# Patient Record
Sex: FEMALE | Race: OTHER | NOT HISPANIC OR LATINO
[De-identification: names, ages, dates, MRNs, and addresses within clinical notes are randomized per-mention and may not be internally consistent; named-entity substitution may affect disease eponyms.]

---

## 2017-09-13 ENCOUNTER — RESULT REVIEW (OUTPATIENT)
Age: 27
End: 2017-09-13

## 2017-09-17 ENCOUNTER — EMERGENCY (EMERGENCY)
Facility: HOSPITAL | Age: 27
LOS: 1 days | Discharge: HOME | End: 2017-09-17
Attending: EMERGENCY MEDICINE | Admitting: EMERGENCY MEDICINE
Payer: COMMERCIAL

## 2017-09-17 VITALS
TEMPERATURE: 98 F | SYSTOLIC BLOOD PRESSURE: 113 MMHG | DIASTOLIC BLOOD PRESSURE: 78 MMHG | OXYGEN SATURATION: 98 % | HEIGHT: 66 IN | RESPIRATION RATE: 17 BRPM | HEART RATE: 97 BPM | WEIGHT: 195.11 LBS

## 2017-09-17 VITALS
SYSTOLIC BLOOD PRESSURE: 99 MMHG | HEART RATE: 71 BPM | TEMPERATURE: 98 F | DIASTOLIC BLOOD PRESSURE: 71 MMHG | RESPIRATION RATE: 15 BRPM | OXYGEN SATURATION: 98 %

## 2017-09-17 DIAGNOSIS — Z98.890 OTHER SPECIFIED POSTPROCEDURAL STATES: Chronic | ICD-10-CM

## 2017-09-17 DIAGNOSIS — Z90.5 ACQUIRED ABSENCE OF KIDNEY: Chronic | ICD-10-CM

## 2017-09-17 DIAGNOSIS — H53.9 UNSPECIFIED VISUAL DISTURBANCE: ICD-10-CM

## 2017-09-17 DIAGNOSIS — R51 HEADACHE: ICD-10-CM

## 2017-09-17 PROCEDURE — 70450 CT HEAD/BRAIN W/O DYE: CPT | Mod: 26

## 2017-09-17 PROCEDURE — 99284 EMERGENCY DEPT VISIT MOD MDM: CPT

## 2017-09-17 PROCEDURE — 99284 EMERGENCY DEPT VISIT MOD MDM: CPT | Mod: 25

## 2017-09-17 PROCEDURE — 70450 CT HEAD/BRAIN W/O DYE: CPT

## 2017-09-17 RX ORDER — ACETAMINOPHEN 500 MG
650 TABLET ORAL ONCE
Qty: 0 | Refills: 0 | Status: COMPLETED | OUTPATIENT
Start: 2017-09-17 | End: 2017-09-17

## 2017-09-17 RX ORDER — IBUPROFEN 200 MG
600 TABLET ORAL ONCE
Qty: 0 | Refills: 0 | Status: COMPLETED | OUTPATIENT
Start: 2017-09-17 | End: 2017-09-17

## 2017-09-17 RX ADMIN — Medication 600 MILLIGRAM(S): at 11:35

## 2017-09-17 RX ADMIN — Medication 650 MILLIGRAM(S): at 11:35

## 2017-09-17 NOTE — ED ADULT NURSE NOTE - OBJECTIVE STATEMENT
pt aox4," blurred vision x 1 month, double vision since Yesterday" no n/v, no sob, on Birth C. p, no weakness

## 2017-09-17 NOTE — ED ADULT NURSE NOTE - CAS DISCH CONDITION
Anesthesia Evaluation        Airway   Mallampati: I  TM distance: >3 FB  Neck ROM: full  no difficulty expected  Dental      Pulmonary    (+) COPD, sleep apnea,   Cardiovascular     (+) hypertension, past MI , CAD, CABG, CHF, PVD,       Neuro/Psych  (+) psychiatric history,    GI/Hepatic/Renal/Endo    (+)  GERD, PUD, diabetes mellitus,     Musculoskeletal     Abdominal    Substance History      OB/GYN          Other                                Anesthesia Plan    ASA 4     general   (Ryan)  intravenous induction   Anesthetic plan and risks discussed with patient.    Plan discussed with CRNA.      
Improved

## 2017-09-17 NOTE — ED ADULT NURSE NOTE - CHIEF COMPLAINT QUOTE
"I had double vision yesterday and 2 of my toes are tingling. I was referred to a neurologist. My doctor wants me to get a cat scan to R/O pseudo tumor"

## 2017-09-17 NOTE — ED PROVIDER NOTE - OBJECTIVE STATEMENT
double vision yesterday, lasted 30 seconds, pain back of eyes x 2 months.  pt saw Dr. Colon (opthal) this past Friday, referred to neurologist ot r/o pseudotumor and unable to get an appt until October 11. 2017.  pt has go on the plane this Friday and didn't want a tumor in her brain.

## 2017-09-17 NOTE — ED ADULT NURSE REASSESSMENT NOTE - COMFORT CARE
wait time explained/side rails up/plan of care explained/assisted to bathroom/darkened lights/repositioned

## 2017-09-17 NOTE — ED ADULT NURSE REASSESSMENT NOTE - NS ED NURSE REASSESS COMMENT FT1
received pt on #6. came in ED with c/o headache 6/10 at this time, due medication given as ordered. patient stated she went to see her Gyne MD and a pregnancy test done - negative. patient was s/e by Dinh KAUR and was referred to Neuro.

## 2017-10-11 ENCOUNTER — APPOINTMENT (OUTPATIENT)
Dept: OPHTHALMOLOGY | Facility: CLINIC | Age: 27
End: 2017-10-11
Payer: COMMERCIAL

## 2017-10-11 DIAGNOSIS — H53.10 UNSPECIFIED SUBJECTIVE VISUAL DISTURBANCES: ICD-10-CM

## 2017-10-11 DIAGNOSIS — Z83.518 FAMILY HISTORY OF OTHER SPECIFIED EYE DISORDER: ICD-10-CM

## 2017-10-11 PROCEDURE — 92083 EXTENDED VISUAL FIELD XM: CPT

## 2017-10-11 PROCEDURE — 99204 OFFICE O/P NEW MOD 45 MIN: CPT

## 2017-10-12 PROBLEM — Z83.518 FAMILY HISTORY OF CATARACTS: Status: ACTIVE | Noted: 2017-10-11

## 2017-10-12 RX ORDER — LOSARTAN POTASSIUM 25 MG/1
25 TABLET, FILM COATED ORAL
Refills: 0 | Status: ACTIVE | COMMUNITY

## 2017-11-07 ENCOUNTER — APPOINTMENT (OUTPATIENT)
Dept: NEUROLOGY | Facility: CLINIC | Age: 27
End: 2017-11-07
Payer: COMMERCIAL

## 2017-11-07 VITALS
SYSTOLIC BLOOD PRESSURE: 110 MMHG | RESPIRATION RATE: 16 BRPM | DIASTOLIC BLOOD PRESSURE: 64 MMHG | HEART RATE: 88 BPM | OXYGEN SATURATION: 98 % | WEIGHT: 218 LBS | HEIGHT: 66 IN | BODY MASS INDEX: 35.03 KG/M2

## 2017-11-07 DIAGNOSIS — G43.709 CHRONIC MIGRAINE W/OUT AURA, NOT INTRACTABLE, W/OUT STATUS MIGRAINOSUS: ICD-10-CM

## 2017-11-07 PROCEDURE — 99244 OFF/OP CNSLTJ NEW/EST MOD 40: CPT

## 2017-11-07 RX ORDER — PROPRANOLOL HYDROCHLORIDE 80 MG/1
80 CAPSULE, EXTENDED RELEASE ORAL
Qty: 30 | Refills: 3 | Status: ACTIVE | COMMUNITY
Start: 2017-11-07 | End: 1900-01-01

## 2017-11-26 ENCOUNTER — FORM ENCOUNTER (OUTPATIENT)
Age: 27
End: 2017-11-26

## 2017-11-27 ENCOUNTER — APPOINTMENT (OUTPATIENT)
Dept: MRI IMAGING | Facility: CLINIC | Age: 27
End: 2017-11-27
Payer: COMMERCIAL

## 2017-11-27 ENCOUNTER — OUTPATIENT (OUTPATIENT)
Dept: OUTPATIENT SERVICES | Facility: HOSPITAL | Age: 27
LOS: 1 days | End: 2017-11-27
Payer: COMMERCIAL

## 2017-11-27 DIAGNOSIS — Z98.890 OTHER SPECIFIED POSTPROCEDURAL STATES: Chronic | ICD-10-CM

## 2017-11-27 DIAGNOSIS — Z00.8 ENCOUNTER FOR OTHER GENERAL EXAMINATION: ICD-10-CM

## 2017-11-27 DIAGNOSIS — Z90.5 ACQUIRED ABSENCE OF KIDNEY: Chronic | ICD-10-CM

## 2017-11-27 PROCEDURE — 70551 MRI BRAIN STEM W/O DYE: CPT

## 2017-11-27 PROCEDURE — 70551 MRI BRAIN STEM W/O DYE: CPT | Mod: 26

## 2019-02-28 ENCOUNTER — OUTPATIENT (OUTPATIENT)
Dept: OUTPATIENT SERVICES | Facility: HOSPITAL | Age: 29
LOS: 1 days | End: 2019-02-28
Payer: COMMERCIAL

## 2019-02-28 ENCOUNTER — APPOINTMENT (OUTPATIENT)
Dept: NUCLEAR MEDICINE | Facility: HOSPITAL | Age: 29
End: 2019-02-28

## 2019-02-28 DIAGNOSIS — Z90.5 ACQUIRED ABSENCE OF KIDNEY: Chronic | ICD-10-CM

## 2019-02-28 DIAGNOSIS — I89.0 LYMPHEDEMA, NOT ELSEWHERE CLASSIFIED: ICD-10-CM

## 2019-02-28 DIAGNOSIS — Z98.890 OTHER SPECIFIED POSTPROCEDURAL STATES: Chronic | ICD-10-CM

## 2019-02-28 PROCEDURE — A9541: CPT

## 2019-02-28 PROCEDURE — 78195 LYMPH SYSTEM IMAGING: CPT

## 2019-02-28 PROCEDURE — 78195 LYMPH SYSTEM IMAGING: CPT | Mod: 26

## 2021-09-05 ENCOUNTER — OUTPATIENT (OUTPATIENT)
Dept: INPATIENT UNIT | Facility: HOSPITAL | Age: 31
LOS: 1 days | Discharge: HOME | End: 2021-09-05
Payer: COMMERCIAL

## 2021-09-05 VITALS — OXYGEN SATURATION: 99 %

## 2021-09-05 VITALS — HEART RATE: 112 BPM | OXYGEN SATURATION: 99 %

## 2021-09-05 DIAGNOSIS — Z98.82 BREAST IMPLANT STATUS: Chronic | ICD-10-CM

## 2021-09-05 DIAGNOSIS — Z90.5 ACQUIRED ABSENCE OF KIDNEY: Chronic | ICD-10-CM

## 2021-09-05 DIAGNOSIS — O26.899 OTHER SPECIFIED PREGNANCY RELATED CONDITIONS, UNSPECIFIED TRIMESTER: ICD-10-CM

## 2021-09-05 DIAGNOSIS — Z90.79 ACQUIRED ABSENCE OF OTHER GENITAL ORGAN(S): Chronic | ICD-10-CM

## 2021-09-05 DIAGNOSIS — Z3A.00 WEEKS OF GESTATION OF PREGNANCY NOT SPECIFIED: ICD-10-CM

## 2021-09-05 DIAGNOSIS — Z98.890 OTHER SPECIFIED POSTPROCEDURAL STATES: Chronic | ICD-10-CM

## 2021-09-05 LAB
ALBUMIN SERPL ELPH-MCNC: 3.6 G/DL — SIGNIFICANT CHANGE UP (ref 3.3–5)
ALP SERPL-CCNC: 139 U/L — HIGH (ref 40–120)
ALT FLD-CCNC: 20 U/L — SIGNIFICANT CHANGE UP (ref 4–33)
ANION GAP SERPL CALC-SCNC: 16 MMOL/L — HIGH (ref 7–14)
APPEARANCE UR: CLEAR — SIGNIFICANT CHANGE UP
AST SERPL-CCNC: 20 U/L — SIGNIFICANT CHANGE UP (ref 4–32)
B PERT DNA SPEC QL NAA+PROBE: SIGNIFICANT CHANGE UP
B PERT+PARAPERT DNA PNL SPEC NAA+PROBE: SIGNIFICANT CHANGE UP
BACTERIA # UR AUTO: ABNORMAL
BILIRUB SERPL-MCNC: 0.4 MG/DL — SIGNIFICANT CHANGE UP (ref 0.2–1.2)
BILIRUB UR-MCNC: NEGATIVE — SIGNIFICANT CHANGE UP
BORDETELLA PARAPERTUSSIS (RAPRVP): SIGNIFICANT CHANGE UP
BUN SERPL-MCNC: 13 MG/DL — SIGNIFICANT CHANGE UP (ref 7–23)
C PNEUM DNA SPEC QL NAA+PROBE: SIGNIFICANT CHANGE UP
CALCIUM SERPL-MCNC: 8.9 MG/DL — SIGNIFICANT CHANGE UP (ref 8.4–10.5)
CHLORIDE SERPL-SCNC: 99 MMOL/L — SIGNIFICANT CHANGE UP (ref 98–107)
CO2 SERPL-SCNC: 18 MMOL/L — LOW (ref 22–31)
COLOR SPEC: YELLOW — SIGNIFICANT CHANGE UP
CREAT SERPL-MCNC: 0.71 MG/DL — SIGNIFICANT CHANGE UP (ref 0.5–1.3)
DIFF PNL FLD: ABNORMAL
EPI CELLS # UR: SIGNIFICANT CHANGE UP /HPF (ref 0–5)
FLUAV SUBTYP SPEC NAA+PROBE: SIGNIFICANT CHANGE UP
FLUBV RNA SPEC QL NAA+PROBE: SIGNIFICANT CHANGE UP
GLUCOSE SERPL-MCNC: 112 MG/DL — HIGH (ref 70–99)
GLUCOSE UR QL: NEGATIVE — SIGNIFICANT CHANGE UP
HADV DNA SPEC QL NAA+PROBE: SIGNIFICANT CHANGE UP
HCOV 229E RNA SPEC QL NAA+PROBE: SIGNIFICANT CHANGE UP
HCOV HKU1 RNA SPEC QL NAA+PROBE: SIGNIFICANT CHANGE UP
HCOV NL63 RNA SPEC QL NAA+PROBE: SIGNIFICANT CHANGE UP
HCOV OC43 RNA SPEC QL NAA+PROBE: SIGNIFICANT CHANGE UP
HCT VFR BLD CALC: 35.8 % — SIGNIFICANT CHANGE UP (ref 34.5–45)
HGB BLD-MCNC: 12.1 G/DL — SIGNIFICANT CHANGE UP (ref 11.5–15.5)
HMPV RNA SPEC QL NAA+PROBE: SIGNIFICANT CHANGE UP
HPIV1 RNA SPEC QL NAA+PROBE: SIGNIFICANT CHANGE UP
HPIV2 RNA SPEC QL NAA+PROBE: SIGNIFICANT CHANGE UP
HPIV3 RNA SPEC QL NAA+PROBE: SIGNIFICANT CHANGE UP
HPIV4 RNA SPEC QL NAA+PROBE: SIGNIFICANT CHANGE UP
HYALINE CASTS # UR AUTO: SIGNIFICANT CHANGE UP /LPF (ref 0–7)
KETONES UR-MCNC: ABNORMAL
LEUKOCYTE ESTERASE UR-ACNC: NEGATIVE — SIGNIFICANT CHANGE UP
M PNEUMO DNA SPEC QL NAA+PROBE: SIGNIFICANT CHANGE UP
MCHC RBC-ENTMCNC: 28.5 PG — SIGNIFICANT CHANGE UP (ref 27–34)
MCHC RBC-ENTMCNC: 33.8 GM/DL — SIGNIFICANT CHANGE UP (ref 32–36)
MCV RBC AUTO: 84.4 FL — SIGNIFICANT CHANGE UP (ref 80–100)
NITRITE UR-MCNC: NEGATIVE — SIGNIFICANT CHANGE UP
NRBC # BLD: 0 /100 WBCS — SIGNIFICANT CHANGE UP
NRBC # FLD: 0 K/UL — SIGNIFICANT CHANGE UP
PH UR: 6 — SIGNIFICANT CHANGE UP (ref 5–8)
PLATELET # BLD AUTO: 467 K/UL — HIGH (ref 150–400)
POTASSIUM SERPL-MCNC: 3.9 MMOL/L — SIGNIFICANT CHANGE UP (ref 3.5–5.3)
POTASSIUM SERPL-SCNC: 3.9 MMOL/L — SIGNIFICANT CHANGE UP (ref 3.5–5.3)
PROT SERPL-MCNC: 7.1 G/DL — SIGNIFICANT CHANGE UP (ref 6–8.3)
PROT UR-MCNC: ABNORMAL
RAPID RVP RESULT: SIGNIFICANT CHANGE UP
RBC # BLD: 4.24 M/UL — SIGNIFICANT CHANGE UP (ref 3.8–5.2)
RBC # FLD: 13.2 % — SIGNIFICANT CHANGE UP (ref 10.3–14.5)
RBC CASTS # UR COMP ASSIST: SIGNIFICANT CHANGE UP /HPF (ref 0–4)
RSV RNA SPEC QL NAA+PROBE: SIGNIFICANT CHANGE UP
RV+EV RNA SPEC QL NAA+PROBE: SIGNIFICANT CHANGE UP
SARS-COV-2 RNA SPEC QL NAA+PROBE: SIGNIFICANT CHANGE UP
SODIUM SERPL-SCNC: 133 MMOL/L — LOW (ref 135–145)
SP GR SPEC: 1.03 — SIGNIFICANT CHANGE UP (ref 1–1.05)
UROBILINOGEN FLD QL: SIGNIFICANT CHANGE UP
WBC # BLD: 13.33 K/UL — HIGH (ref 3.8–10.5)
WBC # FLD AUTO: 13.33 K/UL — HIGH (ref 3.8–10.5)
WBC UR QL: SIGNIFICANT CHANGE UP /HPF (ref 0–5)

## 2021-09-05 PROCEDURE — 76815 OB US LIMITED FETUS(S): CPT | Mod: 26

## 2021-09-05 PROCEDURE — 99214 OFFICE O/P EST MOD 30 MIN: CPT | Mod: 25

## 2021-09-05 PROCEDURE — 59025 FETAL NON-STRESS TEST: CPT | Mod: 26

## 2021-09-05 RX ORDER — ACETAMINOPHEN 500 MG
975 TABLET ORAL ONCE
Refills: 0 | Status: COMPLETED | OUTPATIENT
Start: 2021-09-05 | End: 2021-09-05

## 2021-09-05 RX ORDER — SODIUM CHLORIDE 9 MG/ML
500 INJECTION, SOLUTION INTRAVENOUS ONCE
Refills: 0 | Status: COMPLETED | OUTPATIENT
Start: 2021-09-05 | End: 2021-09-05

## 2021-09-05 RX ORDER — ONDANSETRON 8 MG/1
8 TABLET, FILM COATED ORAL ONCE
Refills: 0 | Status: DISCONTINUED | OUTPATIENT
Start: 2021-09-05 | End: 2021-09-05

## 2021-09-05 RX ORDER — ONDANSETRON 8 MG/1
8 TABLET, FILM COATED ORAL ONCE
Refills: 0 | Status: COMPLETED | OUTPATIENT
Start: 2021-09-05 | End: 2021-09-05

## 2021-09-05 RX ORDER — SODIUM CHLORIDE 9 MG/ML
1000 INJECTION, SOLUTION INTRAVENOUS
Refills: 0 | Status: DISCONTINUED | OUTPATIENT
Start: 2021-09-05 | End: 2021-09-19

## 2021-09-05 RX ORDER — SODIUM CHLORIDE 9 MG/ML
1000 INJECTION, SOLUTION INTRAVENOUS ONCE
Refills: 0 | Status: COMPLETED | OUTPATIENT
Start: 2021-09-05 | End: 2021-09-05

## 2021-09-05 RX ADMIN — Medication 975 MILLIGRAM(S): at 09:53

## 2021-09-05 RX ADMIN — SODIUM CHLORIDE 250 MILLILITER(S): 9 INJECTION, SOLUTION INTRAVENOUS at 11:35

## 2021-09-05 RX ADMIN — SODIUM CHLORIDE 1000 MILLILITER(S): 9 INJECTION, SOLUTION INTRAVENOUS at 09:54

## 2021-09-05 RX ADMIN — ONDANSETRON 8 MILLIGRAM(S): 8 TABLET, FILM COATED ORAL at 10:16

## 2021-09-05 RX ADMIN — SODIUM CHLORIDE 1000 MILLILITER(S): 9 INJECTION, SOLUTION INTRAVENOUS at 11:03

## 2021-09-05 NOTE — OB RN TRIAGE NOTE - NS_OBGYNHISTORY_OBGYN_ALL_OB_FT
SAB WITH D&C DONE 2018  ECTOPIC WITH LEFT SALPINGECTOMY AGE 16  2020  MALE 6-12. POSTPARTUM READMIT FOR ENDOMETRITIS. POSTPARTUM DEPRESSION

## 2021-09-05 NOTE — OB PROVIDER TRIAGE NOTE - NSHPLABSRESULTS_GEN_ALL_CORE
CBC Full  -  ( 05 Sep 2021 09:58 )  WBC Count : 13.33 K/uL  RBC Count : 4.24 M/uL  Hemoglobin : 12.1 g/dL  Hematocrit : 35.8 %  Platelet Count - Automated : 467 K/uL  Mean Cell Volume : 84.4 fL  Mean Cell Hemoglobin : 28.5 pg  Mean Cell Hemoglobin Concentration : 33.8 gm/dL  Auto Neutrophil # : x  Auto Lymphocyte # : x  Auto Monocyte # : x  Auto Eosinophil # : x  Auto Basophil # : x  Auto Neutrophil % : x  Auto Lymphocyte % : x  Auto Monocyte % : x  Auto Eosinophil % : x  Auto Basophil % : x        133<L>  |  99  |  13  ----------------------------<  112<H>  3.9   |  18<L>  |  0.71    Ca    8.9      05 Sep 2021 09:58    TPro  7.1  /  Alb  3.6  /  TBili  0.4  /  DBili  x   /  AST  20  /  ALT  20  /  AlkPhos  139<H>      Urinalysis Basic - ( 05 Sep 2021 09:58 )    Color: Yellow / Appearance: Clear / S.033 / pH: x  Gluc: x / Ketone: Large  / Bili: Negative / Urobili: <2 mg/dL   Blood: x / Protein: >600 mg/dL / Nitrite: Negative   Leuk Esterase: Negative / RBC: 4-6 /HPF / WBC 0-5 /HPF   Sq Epi: x / Non Sq Epi: 5-10 /HPF / Bacteria: Few    covid   amylase/lipase CBC Full  -  ( 05 Sep 2021 09:58 )  WBC Count : 13.33 K/uL  RBC Count : 4.24 M/uL  Hemoglobin : 12.1 g/dL  Hematocrit : 35.8 %  Platelet Count - Automated : 467 K/uL  Mean Cell Volume : 84.4 fL  Mean Cell Hemoglobin : 28.5 pg  Mean Cell Hemoglobin Concentration : 33.8 gm/dL  Auto Neutrophil # : x  Auto Lymphocyte # : x  Auto Monocyte # : x  Auto Eosinophil # : x  Auto Basophil # : x  Auto Neutrophil % : x  Auto Lymphocyte % : x  Auto Monocyte % : x  Auto Eosinophil % : x  Auto Basophil % : x        133<L>  |  99  |  13  ----------------------------<  112<H>  3.9   |  18<L>  |  0.71    Ca    8.9      05 Sep 2021 09:58    TPro  7.1  /  Alb  3.6  /  TBili  0.4  /  DBili  x   /  AST  20  /  ALT  20  /  AlkPhos  139<H>      Urinalysis Basic - ( 05 Sep 2021 09:58 )    Color: Yellow / Appearance: Clear / S.033 / pH: x  Gluc: x / Ketone: Large  / Bili: Negative / Urobili: <2 mg/dL   Blood: x / Protein: >600 mg/dL / Nitrite: Negative   Leuk Esterase: Negative / RBC: 4-6 /HPF / WBC 0-5 /HPF   Sq Epi: x / Non Sq Epi: 5-10 /HPF / Bacteria: Few    covid / RVP- not detected   amylase/lipase CBC Full  -  ( 05 Sep 2021 09:58 )  WBC Count : 13.33 K/uL  RBC Count : 4.24 M/uL  Hemoglobin : 12.1 g/dL  Hematocrit : 35.8 %  Platelet Count - Automated : 467 K/uL  Mean Cell Volume : 84.4 fL  Mean Cell Hemoglobin : 28.5 pg  Mean Cell Hemoglobin Concentration : 33.8 gm/dL  Auto Neutrophil # : x  Auto Lymphocyte # : x  Auto Monocyte # : x  Auto Eosinophil # : x  Auto Basophil # : x  Auto Neutrophil % : x  Auto Lymphocyte % : x  Auto Monocyte % : x  Auto Eosinophil % : x  Auto Basophil % : x        133<L>  |  99  |  13  ----------------------------<  112<H>  3.9   |  18<L>  |  0.71    Ca    8.9      05 Sep 2021 09:58    TPro  7.1  /  Alb  3.6  /  TBili  0.4  /  DBili  x   /  AST  20  /  ALT  20  /  AlkPhos  139<H>      Urinalysis Basic - ( 05 Sep 2021 09:58 )    Color: Yellow / Appearance: Clear / S.033 / pH: x  Gluc: x / Ketone: Large  / Bili: Negative / Urobili: <2 mg/dL   Blood: x / Protein: >600 mg/dL / Nitrite: Negative   Leuk Esterase: Negative / RBC: 4-6 /HPF / WBC 0-5 /HPF   Sq Epi: x / Non Sq Epi: 5-10 /HPF / Bacteria: Few    covid / RVP- not detected   amylase- 67/lipase- 28
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
## 2021-09-05 NOTE — OB RN TRIAGE NOTE - NSICDXPASTSURGICALHX_GEN_ALL_CORE_FT
PAST SURGICAL HISTORY:  H/O arthroscopy left knee x5 for ACL/MCL    H/O breast augmentation age 20 reduction    History of salpingectomy ectopic pregnancy age 16

## 2021-09-05 NOTE — OB PROVIDER TRIAGE NOTE - NSHPPHYSICALEXAM_GEN_ALL_CORE
abdomen: soft, nt on palp  T(C): 37.9 (09-05-21 @ 10:01), Max: 37.9 (09-05-21 @ 09:56) rectal temp   HR: 113 (09-05-21 @ 10:49) (112 - 142)  BP: 110/56 (09-05-21 @ 10:49) (110/56 - 138/81)  RR: 16 (09-05-21 @ 10:01) (16 - 16)  SpO2: 99% (09-05-21 @ 09:57) (99% - 99%)  SVE: 0.5/50/-3 abdomen: soft, nt on palp  T(C): 37.9 (09-05-21 @ 10:01), Max: 37.9 (09-05-21 @ 09:56) rectal temp   HR: 113 (09-05-21 @ 10:49) (112 - 142)  BP: 110/56 (09-05-21 @ 10:49) (110/56 - 138/81)  RR: 16 (09-05-21 @ 10:01) (16 - 16)  SpO2: 99% (09-05-21 @ 09:57) (99% - 99%)  SVE: 0.5/50/-3    141/73 18 36.8 125

## 2021-09-05 NOTE — OB PROVIDER TRIAGE NOTE - HISTORY OF PRESENT ILLNESS
30 y/o  @ 34.1 wks gestation presents from [facility] , pt was with her son who was admitted for severe dehydration/ gastroenteritis  pt states has been having diarrhea since yesterday and started vomiting @ midnight and states started with a fever and abdominal pain ~ 0800 denies any LOF or VB  reports +FM pt receives her PNC @ [place] ap care has been uncomplicated thus far   Covid vaccinated - Moderna

## 2021-09-05 NOTE — OB PROVIDER TRIAGE NOTE - NS_OBGYNHISTORY_OBGYN_ALL_OB_FT
SAB WITH D&C DONE 2018  ECTOPIC WITH LEFT SALPINGECTOMY AGE 16  2020  MALE 6-12. POSTPARTUM READMIT FOR ENDOMETRITIS. POSTPARTUM DEPRESSION OB HX:   SAB WITH D&C DONE 2018  ECTOPIC WITH LEFT SALPINGECTOMY AGE 16  2020  MALE 6-12. POSTPARTUM READMIT FOR ENDOMETRITIS. POSTPARTUM DEPRESSION  GYN HX: denies

## 2021-09-05 NOTE — OB PROVIDER TRIAGE NOTE - NSICDXPASTSURGICALHX_GEN_ALL_CORE_FT
Patrice Morocho Jr. returns in follow-up May 22, 2019 for his obstructive sleep apnea syndrome. He is using his CPAP nightly and continues to feel benefit from it. He thinks he sleeps between 7 and 8 hours. He wakes up feeling refreshed. He uses nasal pillows and was interested in trying a Dreamwear nasal mask.    On physical examination he was pleasant and alert in no distress.  Blood pressure 128/76, pulse 79, respiratory rate of 18. He weighed 93.8 kg, up 2.3 kg from when I saw him last year. Oxygen saturation was 97% on room air rest. His [place] score was normal at 4.  Lungs were clear to auscultation and percussion. There is no use of accessory respiratory muscles and chest expansion was equal. Cardiac exam had a regular rate and rhythm without murmur.    I reviewed his download with him. This showed excellent compliance with 100% of the nights having at least 4 hours of use and average daily use being 6 hours and 57 minutes. On 13 cm of CPAP his apnea hypopnea index was normal at 1.5. Leak was mild to moderate. This rarely bothers him.    Impression  1. Obstructive sleep apnea syndrome. Doing well with CPAP both symptomatically and by download. He'll continue on his 13 cm of pressure. We will assist him in obtaining a Dreamwear nasal mask, letting [facility] note that he is interested in that mask. He will give him a call.  I will see him back in a year.   PAST SURGICAL HISTORY:  H/O arthroscopy left knee x5 for ACL/MCL    H/O breast augmentation age 20 reduction    History of salpingectomy ectopic pregnancy age 16

## 2021-09-05 NOTE — OB PROVIDER TRIAGE NOTE - ADDITIONAL INSTRUCTIONS
pt likely with gastroenteritis   maternal and fetal status reassuring   plan of care d/w dr zepeda  discharge home   PTL precautions d/w pt  increase fluid intake  instructed on fetal kick counts  follow up with OB this week  follow a BRAT/ bland diet   w/v discharge instructions given  discharged home

## 2021-09-05 NOTE — OB PROVIDER TRIAGE NOTE - NSOBPROVIDERNOTE_OBGYN_ALL_OB_FT
32 y/o  @ 34.1 wks gestation presents from [facility] , pt was with her son who was admitted for severe dehydration/ gastroenteritis  pt states has been having diarrhea since yesterday and started vomiting @ midnight and states started with a fever and abdominal pain ~ 0800 denies any LOF or VB  reports +FM pt receives her PNC @ [place] ap care has been uncomplicated thus far   Covid vaccinated - Moderna     plan of care d/w dr zepeda  IVLR 1000 milliliter bolus   IVLR 500 milliliter bolus   Zofran 8 mg IVP  Tylenol 975 mg po x's 1  RVP  continuous EFM   will continue to monitor 32 y/o  @ 34.1 wks gestation presents from [facility] , pt was with her son who was admitted for severe dehydration/ gastroenteritis  pt states has been having diarrhea since yesterday and started vomiting @ midnight and states started with a fever and abdominal pain ~ 0800 denies any LOF or VB  reports +FM pt receives her PNC @ [place] ap care has been uncomplicated thus far   Covid vaccinated - Moderna     plan of care d/w dr zepeda  IVLR 1000 milliliter bolus   IVLR 500 milliliter bolus   Zofran 8 mg IVP  Tylenol 975 mg po x's 1  RVP  continuous EFM   will continue to monitor      addendum @ 1230:  pt tolerated po solids and fluids   cat 1 FHT  toco: no uterine contractions noted   TAS: BPP: 8/8 vtx anterior placenta SOFÍA: 14.74     pt states feeling better   plan of care d/w dr zepeda   pt may be discharged home 32 y/o  @ 34.1 wks gestation presents from [facility] , pt was with her son who was admitted for severe dehydration/ gastroenteritis  pt states has been having diarrhea since yesterday and started vomiting @ midnight and states started with a fever and abdominal pain ~ 0800 denies any LOF or VB  reports +FM pt receives her PNC @ [place] ap care has been uncomplicated thus far   Covid vaccinated - Moderna     plan of care d/w dr zepeda  IVLR 1000 milliliter bolus   IVLR 500 milliliter bolus   Zofran 8 mg IVP  Tylenol 975 mg po x's 1  RVP  continuous EFM   will continue to monitor      addendum @ 1230:  pt tolerated po solids and fluids   cat 1 FHT  toco: no uterine contractions noted   TAS: BPP: 8/8 vtx anterior placenta SOFÍA: 14.74     pt states feeling better   plan of care d/w dr zepeda   pt may be discharged home  awaiting  amylase/lipase 30 y/o  @ 34.1 wks gestation presents from [facility] , pt was with her son who was admitted for severe dehydration/ gastroenteritis  pt states has been having diarrhea since yesterday and started vomiting @ midnight and states started with a fever and abdominal pain ~ 0800 denies any LOF or VB  reports +FM pt receives her PNC @ [place] ap care has been uncomplicated thus far   Covid vaccinated - Moderna     plan of care d/w dr zepeda  IVLR 1000 milliliter bolus   IVLR 500 milliliter bolus   Zofran 8 mg IVP  Tylenol 975 mg po x's 1  RVP  continuous EFM   will continue to monitor      addendum @ 1230:  pt tolerated po solids and fluids   cat 1 FHT  toco: no uterine contractions noted   TAS: BPP: 8/8 vtx anterior placenta SOFÍA: 14.74     pt states feeling better   plan of care d/w dr zepeda   pt may be discharged home  awaiting  amylase/lipase    pt likely with gastroenteritis   maternal and fetal status reassuring   plan of care d/w dr zepeda  discharge home   PTL precautions d/w pt  increase fluid intake  instructed on fetal kick counts  follow up with OB this week  follow a BRAT/ bland diet   w/v discharge instructions given  discharged home

## 2023-07-12 ENCOUNTER — NON-APPOINTMENT (OUTPATIENT)
Age: 33
End: 2023-07-12